# Patient Record
Sex: FEMALE | Race: WHITE
[De-identification: names, ages, dates, MRNs, and addresses within clinical notes are randomized per-mention and may not be internally consistent; named-entity substitution may affect disease eponyms.]

---

## 2021-11-04 ENCOUNTER — RESULT REVIEW (OUTPATIENT)
Age: 51
End: 2021-11-04

## 2022-01-13 ENCOUNTER — RESULT REVIEW (OUTPATIENT)
Age: 52
End: 2022-01-13

## 2022-07-14 PROBLEM — Z00.00 ENCOUNTER FOR PREVENTIVE HEALTH EXAMINATION: Status: ACTIVE | Noted: 2022-07-14

## 2022-08-31 ENCOUNTER — APPOINTMENT (OUTPATIENT)
Dept: BARIATRICS/WEIGHT MGMT | Facility: CLINIC | Age: 52
End: 2022-08-31

## 2022-08-31 VITALS
SYSTOLIC BLOOD PRESSURE: 108 MMHG | WEIGHT: 167 LBS | HEIGHT: 65 IN | DIASTOLIC BLOOD PRESSURE: 74 MMHG | HEART RATE: 61 BPM | BODY MASS INDEX: 27.82 KG/M2 | RESPIRATION RATE: 16 BRPM | OXYGEN SATURATION: 99 %

## 2022-08-31 DIAGNOSIS — Z83.3 FAMILY HISTORY OF DIABETES MELLITUS: ICD-10-CM

## 2022-08-31 DIAGNOSIS — M72.2 PLANTAR FASCIAL FIBROMATOSIS: ICD-10-CM

## 2022-08-31 DIAGNOSIS — Z86.39 PERSONAL HISTORY OF OTHER ENDOCRINE, NUTRITIONAL AND METABOLIC DISEASE: ICD-10-CM

## 2022-08-31 DIAGNOSIS — Z80.0 FAMILY HISTORY OF MALIGNANT NEOPLASM OF DIGESTIVE ORGANS: ICD-10-CM

## 2022-08-31 DIAGNOSIS — N95.1 MENOPAUSAL AND FEMALE CLIMACTERIC STATES: ICD-10-CM

## 2022-08-31 PROCEDURE — 99205 OFFICE O/P NEW HI 60 MIN: CPT

## 2022-08-31 RX ORDER — LEVOTHYROXINE SODIUM 50 UG/1
50 TABLET ORAL DAILY
Refills: 0 | Status: ACTIVE | COMMUNITY

## 2022-08-31 RX ORDER — ATORVASTATIN CALCIUM 10 MG/1
10 TABLET, FILM COATED ORAL
Refills: 0 | Status: ACTIVE | COMMUNITY
Start: 2022-08-31

## 2022-08-31 RX ORDER — LIOTHYRONINE SODIUM 50 UG/1
TABLET ORAL
Refills: 0 | Status: ACTIVE | COMMUNITY

## 2022-08-31 NOTE — ASSESSMENT
[FreeTextEntry1] : 52 year old female overweight with h/o anxiety and HPL\par Patient committed to exercise 3-4x a week- online classes, restarting MFP, focusing on avoiding evening eating and using hunger scale.  Discussed lifestyle changes are the foundation for weight loss\par BMI>27 with HPL is appropriate for medication therapy- discussed options and decided on Contrave- reviewed possible side effects including Depression, anxiety, suicidal ideation, headaches, dizziness,insomnia, tinnitus, increased HR/BP, seizures, opioid interactions, dosage titration\par Will taper herself off lexapro as planned-discussed lexapro may be contributing to weight gain but importance of good mental health\par F/U 1 month

## 2022-08-31 NOTE — HISTORY OF PRESENT ILLNESS
[FreeTextEntry1] : 52 year old female referred by Dr. Henao for medical weight loss consultation.\par Patient has been struggling with her weight over the past 4 years but has gained weight steadily throughout her life.  \par  in the 120's, after kids 130's (her children are college aged), mid adulthood 150's now menopausal in the 160's.  \yahaira Started Lexapro about a year ago- struggled with her father's death and stressors related to the pandemic- has been tapering off.  Her daughter just left for college with has been stressful as well. \par Struggles with "mindless snacking" in the evenings- pretzels, peanut butter, skinny pop\par Tried intermittent fasting but ended up eating more in the evenings\par Saw an RD in the past- did not find it helpful.  Has also used MFP\par  is a pescaterian food recall- B egg white omelette with Matt's and vegetables S 100 kcal protein bar or fruit with cottage cheese D fish and vegetables S pretzels\yahaira Has not been exercising this summer- enjoys [employer] classes or youtube\yahaira Works part-time from home\par Good water intake- only drinks water, seltzer, or decaf coffee\par Better sleep now that her daughter left- now getting 8 hours/night  but before was getting about 5 \par Interested in medications today \par BCA- Fat % 38.4, Fat mass 64.2 .8, TBW 72.4, TBW % 4.4 BMR 1411

## 2022-08-31 NOTE — CONSULT LETTER
[Dear  ___] : Dear  [unfilled], [Consult Letter:] : I had the pleasure of evaluating your patient, [unfilled]. [( Thank you for referring [unfilled] for consultation for _____ )] : Thank you for referring [unfilled] for consultation for [unfilled] [Please see my note below.] : Please see my note below. [Sincerely,] : Sincerely, [FreeTextEntry3] : Annette Hill FNP-BC, [facility]

## 2022-09-29 ENCOUNTER — APPOINTMENT (OUTPATIENT)
Dept: BARIATRICS/WEIGHT MGMT | Facility: CLINIC | Age: 52
End: 2022-09-29

## 2022-09-29 VITALS
HEART RATE: 69 BPM | DIASTOLIC BLOOD PRESSURE: 76 MMHG | BODY MASS INDEX: 27.66 KG/M2 | WEIGHT: 166 LBS | SYSTOLIC BLOOD PRESSURE: 108 MMHG | RESPIRATION RATE: 16 BRPM | HEIGHT: 65 IN | OXYGEN SATURATION: 100 %

## 2022-09-29 PROCEDURE — 99213 OFFICE O/P EST LOW 20 MIN: CPT

## 2022-09-29 NOTE — HISTORY OF PRESENT ILLNESS
[FreeTextEntry1] : 52 year old female referred by Dr. Henao for medical weight loss consultation.\par Patient has been struggling with her weight over the past 4 years but has gained weight steadily throughout her life.  \par  in the 120's, after kids 130's (her children are college aged), mid adulthood 150's now menopausal in the 160's.  \par Started Lexapro about a year ago- struggled with her father's death and stressors related to the pandemic- has been tapering off.  Her daughter just left for college with has been stressful as well. \par Struggles with "mindless snacking" in the evenings- pretzels, peanut butter, skinny pop\par Tried intermittent fasting but ended up eating more in the evenings\par Saw an RD in the past- did not find it helpful.  Has also used MFP\par  is a pescaterian food recall- B egg white omelette with Matt's and vegetables S 100 kcal protein bar or fruit with cottage cheese D fish and vegetables S pretzels\par Has not been exercising this summer- enjoys [employer] classes or [employer]\par Works part-time from home\par Good water intake- only drinks water, seltzer, or decaf coffee\par Better sleep now that her daughter left- now getting 8 hours/night  but before was getting about 5 \par Interested in medications today \par BCA- Fat % 38.4, Fat mass 64.2 .8, TBW 72.4, TBW % 4.4 BMR 1414\par \par 9/29/22- Patient's REBECCA passed away last week- had been traveling to [place].  Did second Mounjaro and feeling well on medication- feels appetite suppression and is very happy.  Mild constipation and GERD- treated with tums or gas X PRN.  Has not been exercising.  Mostly eating healthy but did have extra sweets while in [place]

## 2022-09-29 NOTE — ASSESSMENT
[FreeTextEntry1] : 52 year old female overweight with h/o HPL\par Continue Mounjaro 2.5mg/week\par Reinforced lifestyle changes- tracking with MFP, starting exercise routine with focus on cardio and strength training\par Adequate water intake\par Maintaining adequate protein, f/v\par F/U 1 month

## 2022-10-18 ENCOUNTER — TRANSCRIPTION ENCOUNTER (OUTPATIENT)
Age: 52
End: 2022-10-18

## 2022-10-18 VITALS — WEIGHT: 163 LBS | BODY MASS INDEX: 27.12 KG/M2

## 2022-10-19 ENCOUNTER — NON-APPOINTMENT (OUTPATIENT)
Age: 52
End: 2022-10-19

## 2022-10-27 ENCOUNTER — NON-APPOINTMENT (OUTPATIENT)
Age: 52
End: 2022-10-27

## 2022-11-02 ENCOUNTER — APPOINTMENT (OUTPATIENT)
Dept: BARIATRICS/WEIGHT MGMT | Facility: CLINIC | Age: 52
End: 2022-11-02

## 2022-11-02 VITALS — WEIGHT: 164 LBS | BODY MASS INDEX: 27.29 KG/M2

## 2022-11-02 PROCEDURE — 99214 OFFICE O/P EST MOD 30 MIN: CPT | Mod: 95

## 2022-11-02 RX ORDER — LIRAGLUTIDE 6 MG/ML
18 INJECTION, SOLUTION SUBCUTANEOUS DAILY
Qty: 1 | Refills: 5 | Status: DISCONTINUED | COMMUNITY
Start: 2022-09-08 | End: 2022-11-02

## 2022-11-02 RX ORDER — TIRZEPATIDE 2.5 MG/.5ML
2.5 INJECTION, SOLUTION SUBCUTANEOUS
Qty: 1 | Refills: 2 | Status: DISCONTINUED | COMMUNITY
Start: 2022-09-10 | End: 2022-11-02

## 2022-11-02 RX ORDER — NALTREXONE HYDROCHLORIDE AND BUPROPION HYDROCHLORIDE 8; 90 MG/1; MG/1
8-90 TABLET, EXTENDED RELEASE ORAL
Qty: 120 | Refills: 2 | Status: DISCONTINUED | COMMUNITY
Start: 2022-08-31 | End: 2022-11-02

## 2022-11-02 RX ORDER — ESCITALOPRAM OXALATE 5 MG/1
5 TABLET, FILM COATED ORAL
Refills: 0 | Status: DISCONTINUED | COMMUNITY
Start: 2022-08-31 | End: 2022-11-02

## 2022-11-02 NOTE — REASON FOR VISIT
[Home] : at home, [unfilled] , at the time of the visit. [Medical Office: ([facility])___] : at the medical office located in  [Patient] : the patient [Self] : self [Follow-Up] : a follow-up visit

## 2022-11-03 NOTE — ASSESSMENT
[FreeTextEntry1] : 52 year old female overweight\par Patient to consider plenity, wellbutrin, topiramate options- does not have obesity medicine benefits\par Continue lifestyle changes- exercise- mixed cardio and strength training\par Continue mindful eating\par Patient to let me know what she decides\par F/U 1 month

## 2022-11-09 ENCOUNTER — RESULT REVIEW (OUTPATIENT)
Age: 52
End: 2022-11-09

## 2022-12-10 ENCOUNTER — RX RENEWAL (OUTPATIENT)
Age: 52
End: 2022-12-10

## 2022-12-15 ENCOUNTER — TRANSCRIPTION ENCOUNTER (OUTPATIENT)
Age: 52
End: 2022-12-15

## 2023-01-25 ENCOUNTER — APPOINTMENT (OUTPATIENT)
Dept: BARIATRICS/WEIGHT MGMT | Facility: CLINIC | Age: 53
End: 2023-01-25
Payer: COMMERCIAL

## 2023-01-25 VITALS — BODY MASS INDEX: 27.46 KG/M2 | WEIGHT: 165 LBS

## 2023-01-25 PROCEDURE — 99215 OFFICE O/P EST HI 40 MIN: CPT | Mod: 95

## 2023-01-25 RX ORDER — ISOPROPYL ALCOHOL 70 ML/100ML
SWAB TOPICAL
Qty: 1 | Refills: 3 | Status: DISCONTINUED | COMMUNITY
Start: 2022-09-08 | End: 2023-01-25

## 2023-01-25 RX ORDER — PEN NEEDLE, DIABETIC 29 G X1/2"
32G X 4 MM NEEDLE, DISPOSABLE MISCELLANEOUS
Qty: 1 | Refills: 2 | Status: DISCONTINUED | COMMUNITY
Start: 2022-09-08 | End: 2023-01-25

## 2023-01-25 NOTE — HISTORY OF PRESENT ILLNESS
[FreeTextEntry1] : 52 year old female referred by Dr. Henao for medical weight loss consultation.\par Patient has been struggling with her weight over the past 4 years but has gained weight steadily throughout her life.  \par  in the 120's, after kids 130's (her children are college aged), mid adulthood 150's now menopausal in the 160's.  \par Started Lexapro about a year ago- struggled with her father's death and stressors related to the pandemic- has been tapering off.  Her daughter just left for college with has been stressful as well. \par Struggles with "mindless snacking" in the evenings- pretzels, peanut butter, skinny pop\par Tried intermittent fasting but ended up eating more in the evenings\par Saw an RD in the past- did not find it helpful.  Has also used MFP\par  is a pescaterian food recall- B egg white omelette with Matt's and vegetables S 100 kcal protein bar or fruit with cottage cheese D fish and vegetables S pretzels\par Has not been exercising this summer- enjoys [employer] classes or [employer]\par Works part-time from home\par Good water intake- only drinks water, seltzer, or decaf coffee\par Better sleep now that her daughter left- now getting 8 hours/night  but before was getting about 5 \par Interested in medications today \par BCA- Fat % 38.4, Fat mass 64.2 .8, TBW 72.4, TBW % 4.4 BMR 1414\par \par 9/29/22- Patient's REBECCA passed away last week- had been traveling to [place].  Did second Mounjaro and feeling well on medication- feels appetite suppression and is very happy.  Mild constipation and GERD- treated with tums or gas X PRN.  Has not been exercising.  Mostly eating healthy but did have extra sweets while in [place]\par \par 11/2/22- Paitent feeling much better off Mounjaro.  Exercising regularly.  Would like to retry a different medication to help with her hunger.  Off lexapro not feeling further depression.  \par \par 1/25/23- Patient's weight stable.  No negative side effects of wellbutrin 150mg/day has been taking for about 7 weeks.  Went to [place] and lost weight- had stopped snacking but 4 pounds returned back when she came home due to snacking.  Snacking is not out of hunger.  Evening snacking is the worst.  Not exercising.

## 2023-01-25 NOTE — ASSESSMENT
[FreeTextEntry1] : 52 year old female overweight\par Will increase bupropion XL to 300mg/day\par Discussed importance of lifestyle changes- increased exercise- cardio and strength training and avoiding snacking when not hungry.  Will use food scale, avoidance during that time.  Discussed other options than nighttime eating.  Can also track in MFP\par Continue high protein, f/v, complex carbs in moderation\par F/U 1 month

## 2023-01-25 NOTE — REASON FOR VISIT
[Home] : at home, [unfilled] , at the time of the visit. [Medical Office: ([facility])___] : at the medical office located in  [Patient] : the patient [Self] : self [Follow-Up] : a follow-up visit

## 2023-03-09 ENCOUNTER — APPOINTMENT (OUTPATIENT)
Dept: BARIATRICS/WEIGHT MGMT | Facility: CLINIC | Age: 53
End: 2023-03-09

## 2023-03-22 ENCOUNTER — APPOINTMENT (OUTPATIENT)
Dept: BARIATRICS/WEIGHT MGMT | Facility: CLINIC | Age: 53
End: 2023-03-22

## 2023-04-07 VITALS — BODY MASS INDEX: 26.79 KG/M2 | WEIGHT: 161 LBS

## 2023-08-17 ENCOUNTER — NON-APPOINTMENT (OUTPATIENT)
Age: 53
End: 2023-08-17

## 2023-09-28 ENCOUNTER — APPOINTMENT (OUTPATIENT)
Dept: BARIATRICS/WEIGHT MGMT | Facility: CLINIC | Age: 53
End: 2023-09-28
Payer: COMMERCIAL

## 2023-09-28 VITALS
SYSTOLIC BLOOD PRESSURE: 115 MMHG | BODY MASS INDEX: 25.96 KG/M2 | WEIGHT: 156 LBS | HEART RATE: 65 BPM | DIASTOLIC BLOOD PRESSURE: 83 MMHG

## 2023-09-28 DIAGNOSIS — E66.3 OVERWEIGHT: ICD-10-CM

## 2023-09-28 PROCEDURE — 99214 OFFICE O/P EST MOD 30 MIN: CPT

## 2023-10-02 PROBLEM — E66.3 EXCESS WEIGHT: Status: ACTIVE | Noted: 2022-08-31

## 2023-10-24 ENCOUNTER — APPOINTMENT (OUTPATIENT)
Dept: SURGERY | Facility: CLINIC | Age: 53
End: 2023-10-24
Payer: COMMERCIAL

## 2023-10-24 VITALS
HEART RATE: 71 BPM | WEIGHT: 154 LBS | SYSTOLIC BLOOD PRESSURE: 102 MMHG | HEIGHT: 65 IN | BODY MASS INDEX: 25.66 KG/M2 | DIASTOLIC BLOOD PRESSURE: 71 MMHG

## 2023-10-24 DIAGNOSIS — D17.1 BENIGN LIPOMATOUS NEOPLASM OF SKIN AND SUBCUTANEOUS TISSUE OF TRUNK: ICD-10-CM

## 2023-10-24 PROCEDURE — 99203 OFFICE O/P NEW LOW 30 MIN: CPT

## 2023-10-31 ENCOUNTER — RESULT REVIEW (OUTPATIENT)
Age: 53
End: 2023-10-31

## 2023-11-28 ENCOUNTER — APPOINTMENT (OUTPATIENT)
Dept: SURGERY | Facility: CLINIC | Age: 53
End: 2023-11-28
Payer: COMMERCIAL

## 2023-11-28 VITALS — SYSTOLIC BLOOD PRESSURE: 111 MMHG | HEART RATE: 66 BPM | DIASTOLIC BLOOD PRESSURE: 80 MMHG | TEMPERATURE: 98.3 F

## 2023-11-28 DIAGNOSIS — K43.9 VENTRAL HERNIA W/OUT OBSTRUCTION OR GANGRENE: ICD-10-CM

## 2023-11-28 PROCEDURE — 99213 OFFICE O/P EST LOW 20 MIN: CPT

## 2024-01-31 ENCOUNTER — APPOINTMENT (OUTPATIENT)
Dept: BARIATRICS/WEIGHT MGMT | Facility: CLINIC | Age: 54
End: 2024-01-31
Payer: COMMERCIAL

## 2024-01-31 DIAGNOSIS — Z86.39 PERSONAL HISTORY OF OTHER ENDOCRINE, NUTRITIONAL AND METABOLIC DISEASE: ICD-10-CM

## 2024-01-31 DIAGNOSIS — Z86.59 PERSONAL HISTORY OF OTHER MENTAL AND BEHAVIORAL DISORDERS: ICD-10-CM

## 2024-01-31 PROCEDURE — 99213 OFFICE O/P EST LOW 20 MIN: CPT

## 2024-01-31 RX ORDER — BUPROPION HYDROCHLORIDE 300 MG/1
300 TABLET, EXTENDED RELEASE ORAL DAILY
Qty: 90 | Refills: 3 | Status: ACTIVE | COMMUNITY
Start: 2022-11-09 | End: 1900-01-01

## 2024-02-01 VITALS — BODY MASS INDEX: 26.92 KG/M2 | SYSTOLIC BLOOD PRESSURE: 113 MMHG | WEIGHT: 161.8 LBS | DIASTOLIC BLOOD PRESSURE: 71 MMHG

## 2024-02-01 PROBLEM — Z86.59 HISTORY OF ANXIETY: Status: RESOLVED | Noted: 2022-08-31 | Resolved: 2024-02-01

## 2024-02-01 PROBLEM — Z86.39 HISTORY OF HYPERLIPIDEMIA: Status: RESOLVED | Noted: 2022-08-31 | Resolved: 2024-02-01

## 2024-02-01 NOTE — ASSESSMENT
[FreeTextEntry1] : 53 year old female overweight HPL Anxiety Focus on increasing exercise- [employer] programs and walking around her neighborhood- starting tonight Reviewed healthy eating patterns- intermittent fasting if she would like to continue but avoiding evening snacking- high protein f/v, unprocessed foods can track on MFP if she would like structure, low saturated fats  Continue buproprion XL 300mg/day  F/U 2 months

## 2024-02-01 NOTE — HISTORY OF PRESENT ILLNESS
[FreeTextEntry1] : 52 year old female referred by Dr. Henao for medical weight loss consultation. Patient has been struggling with her weight over the past 4 years but has gained weight steadily throughout her life.    in the 120's, after kids 130's (her children are college aged), mid adulthood 150's now menopausal in the 160's.   Started Lexapro about a year ago- struggled with her father's death and stressors related to the pandemic- has been tapering off.  Her daughter just left for college with has been stressful as well.  Struggles with "mindless snacking" in the evenings- pretzels, peanut butter, skinny pop Tried intermittent fasting but ended up eating more in the evenings Saw an RD in the past- did not find it helpful.  Has also used MFP  is a [facility] food recall- B egg white omelette with Matt[employer] and vegetables S 100 kcal protein bar or fruit with cottage cheese D fish and vegetables S pretzels Has not been exercising this summer- enjoys [employer] classes or [employer] part-time from home Good water intake- only drinks water, seltzer, or decaf coffee Better sleep now that her daughter left- now getting 8 hours/night  but before was getting about 5  Interested in medications today  BCA- Fat % 38.4, Fat mass 64.2 .8, TBW 72.4, TBW % 4.4 BMR 1414  9/29/22- Patient's REBECCA passed away last week- had been traveling to [place].  Did second Mounjaro and feeling well on medication- feels appetite suppression and is very happy.  Mild constipation and GERD- treated with tums or gas X PRN.  Has not been exercising.  Mostly eating healthy but did have extra sweets while in [place]  11/2/22- Paitent feeling much better off Mounjaro.  Exercising regularly.  Would like to retry a different medication to help with her hunger.  Off lexapro not feeling further depression.    1/25/23- Patient's weight stable.  No negative side effects of wellbutrin 150mg/day has been taking for about 7 weeks.  Went to [place] and lost weight- had stopped snacking but 4 pounds returned back when she came home due to snacking.  Snacking is not out of hunger.  Evening snacking is the worst.  Not exercising.    10/2/23- -5 pounds- feeling well overall and happy with her weight loss.  Has been doing intermittent fasting 16-8 program and learning about [place] diet .  [employer] for exercise.  Feeling well on wellbutrin 300mg/day- no negative side effects.   GYN- Dr. Vale- addressing other [place]-menopausal changes   1/31/24- Patient had not been eating as healthy and exercising the past few months with the holidays- ready to get back on track.  +intermittent fasting except celary juice in AM.  Cooking for herself consistently or eating out in a healthy way.

## 2024-04-30 ENCOUNTER — APPOINTMENT (OUTPATIENT)
Dept: SURGERY | Facility: CLINIC | Age: 54
End: 2024-04-30
Payer: COMMERCIAL

## 2024-04-30 VITALS
DIASTOLIC BLOOD PRESSURE: 77 MMHG | BODY MASS INDEX: 26.82 KG/M2 | HEIGHT: 65 IN | HEART RATE: 69 BPM | WEIGHT: 161 LBS | SYSTOLIC BLOOD PRESSURE: 112 MMHG

## 2024-04-30 DIAGNOSIS — K43.9 VENTRAL HERNIA W/OUT OBSTRUCTION OR GANGRENE: ICD-10-CM

## 2024-04-30 PROCEDURE — 99213 OFFICE O/P EST LOW 20 MIN: CPT

## 2024-04-30 NOTE — PLAN
[FreeTextEntry1] : Follow up as needed.  Pt not inclined to have repair given lack of symptoms/no pain.

## 2024-04-30 NOTE — HISTORY OF PRESENT ILLNESS
[de-identified] : 54 yr old recently seen for epigastric hernia w fat incarceration.   US in Nov 2023 showing 0.3 cm hernia neck w small amount of fat incarceration.  Pt states she has gained some weight in her upper abdomen due to menopause and wants to make sure her hernia is not enlarging.  It causes her no symptoms and she does not think about it at all.

## 2024-04-30 NOTE — PHYSICAL EXAM
[de-identified] : NAD [de-identified] : Small epigastric hernia present on valsalva, soft nontender.  No change in size from Nov 2023.

## 2024-04-30 NOTE — CONSULT LETTER
[Dear  ___] : Dear  [unfilled], [Consult Letter:] : I had the pleasure of evaluating your patient, [unfilled]. [Please see my note below.] : Please see my note below. [Consult Closing:] : Thank you very much for allowing me to participate in the care of this patient.  If you have any questions, please do not hesitate to contact me. [Sincerely,] : Sincerely, [FreeTextEntry3] : Macy Samson MD FACS Director, [facility] Division of General and Acute Care Surgery Director, Surgical Quality for [facility] Interim Director [facility]   Office phone: [phone] Cell phone:  [phone] email:  [email or web address]

## 2024-11-05 ENCOUNTER — TRANSCRIPTION ENCOUNTER (OUTPATIENT)
Age: 54
End: 2024-11-05

## 2024-11-06 ENCOUNTER — NON-APPOINTMENT (OUTPATIENT)
Age: 54
End: 2024-11-06

## 2024-11-20 ENCOUNTER — APPOINTMENT (OUTPATIENT)
Dept: BARIATRICS/WEIGHT MGMT | Facility: CLINIC | Age: 54
End: 2024-11-20

## 2024-11-20 VITALS — SYSTOLIC BLOOD PRESSURE: 95 MMHG | HEART RATE: 78 BPM | DIASTOLIC BLOOD PRESSURE: 73 MMHG

## 2024-11-20 VITALS — BODY MASS INDEX: 26.13 KG/M2 | WEIGHT: 157 LBS

## 2024-11-20 DIAGNOSIS — Z86.39 PERSONAL HISTORY OF OTHER ENDOCRINE, NUTRITIONAL AND METABOLIC DISEASE: ICD-10-CM

## 2024-11-20 DIAGNOSIS — E66.3 OVERWEIGHT: ICD-10-CM

## 2024-11-20 PROCEDURE — 99212 OFFICE O/P EST SF 10 MIN: CPT

## 2024-11-27 PROBLEM — Z86.39 HISTORY OF HYPERLIPIDEMIA: Status: RESOLVED | Noted: 2022-08-31 | Resolved: 2024-11-27

## 2025-03-26 ENCOUNTER — APPOINTMENT (OUTPATIENT)
Dept: BARIATRICS/WEIGHT MGMT | Facility: CLINIC | Age: 55
End: 2025-03-26
Payer: COMMERCIAL

## 2025-03-26 VITALS — WEIGHT: 159 LBS | BODY MASS INDEX: 26.46 KG/M2

## 2025-03-26 VITALS — SYSTOLIC BLOOD PRESSURE: 108 MMHG | HEART RATE: 77 BPM | DIASTOLIC BLOOD PRESSURE: 73 MMHG

## 2025-03-26 DIAGNOSIS — E66.3 OVERWEIGHT: ICD-10-CM

## 2025-03-26 DIAGNOSIS — Z86.39 PERSONAL HISTORY OF OTHER ENDOCRINE, NUTRITIONAL AND METABOLIC DISEASE: ICD-10-CM

## 2025-03-26 PROCEDURE — 99214 OFFICE O/P EST MOD 30 MIN: CPT

## 2025-04-01 PROBLEM — Z86.39 HISTORY OF HYPERLIPIDEMIA: Status: RESOLVED | Noted: 2022-08-31 | Resolved: 2025-04-01

## 2025-05-20 ENCOUNTER — APPOINTMENT (OUTPATIENT)
Dept: BARIATRICS/WEIGHT MGMT | Facility: CLINIC | Age: 55
End: 2025-05-20
Payer: SELF-PAY

## 2025-05-20 VITALS — BODY MASS INDEX: 26.71 KG/M2 | HEIGHT: 64.5 IN | WEIGHT: 158.4 LBS

## 2025-05-20 DIAGNOSIS — E66.3 OVERWEIGHT: ICD-10-CM

## 2025-05-20 PROCEDURE — 97802 MEDICAL NUTRITION INDIV IN: CPT

## 2025-06-23 ENCOUNTER — NON-APPOINTMENT (OUTPATIENT)
Age: 55
End: 2025-06-23

## 2025-06-24 ENCOUNTER — APPOINTMENT (OUTPATIENT)
Dept: BARIATRICS/WEIGHT MGMT | Facility: CLINIC | Age: 55
End: 2025-06-24
Payer: SELF-PAY

## 2025-06-24 VITALS — WEIGHT: 155 LBS | HEIGHT: 64.5 IN | BODY MASS INDEX: 26.14 KG/M2

## 2025-06-24 PROCEDURE — 97803 MED NUTRITION INDIV SUBSEQ: CPT | Mod: 95

## 2025-07-30 ENCOUNTER — TRANSCRIPTION ENCOUNTER (OUTPATIENT)
Age: 55
End: 2025-07-30

## 2025-08-12 ENCOUNTER — APPOINTMENT (OUTPATIENT)
Dept: BARIATRICS/WEIGHT MGMT | Facility: CLINIC | Age: 55
End: 2025-08-12
Payer: SELF-PAY

## 2025-08-12 VITALS — HEIGHT: 64.5 IN | BODY MASS INDEX: 26.48 KG/M2 | WEIGHT: 157 LBS

## 2025-08-12 DIAGNOSIS — E66.3 OVERWEIGHT: ICD-10-CM

## 2025-08-12 PROCEDURE — 97803 MED NUTRITION INDIV SUBSEQ: CPT | Mod: 95

## 2025-09-11 ENCOUNTER — APPOINTMENT (OUTPATIENT)
Dept: PULMONOLOGY | Facility: CLINIC | Age: 55
End: 2025-09-11